# Patient Record
Sex: MALE | URBAN - METROPOLITAN AREA
[De-identification: names, ages, dates, MRNs, and addresses within clinical notes are randomized per-mention and may not be internally consistent; named-entity substitution may affect disease eponyms.]

---

## 2017-11-14 NOTE — PATIENT DISCUSSION
Pre-Op 2nd Eye Counseling: The patient has noticed an improvement in their visual symptoms in the operative eye. The patient complains of decreased vision in the fellow eye when ___reading and tv. It was explained to the patient that the decision to proceed with cataract surgery in the fellow eye is entirely a separate decision from the surgical eye. All of the same risks, benefits and alternatives are reviewed with the patient again. The patient does feel the vision in the non-operative eye is limiting their daily activities and elects to proceed with cataract surgery in the __right_____ eye. . I have given the patient the prescribed regimen of  drops to use before and after cataract surgery. Patient to administer as directed.

## 2017-11-14 NOTE — PATIENT DISCUSSION
S/P PE IOL, _os__. DOING WELL. CONTINUE PRED-GATI-BROM IN THE SURGICAL EYE  FOR A TOTAL OF 3 WEEKS USE THEN DISCONTINUE. SCHEDULE 2ND EYE CATARACT SURGERY. No

## 2018-12-04 NOTE — PATIENT DISCUSSION
POSTERIOR CAPSULAR FIBROSIS, OU___ - VISUALLY SIGNIFICANT. SCHEDULE YAG CAPSULOTOMY OD___ FIRST THEN LATER YAG CAPSULOTOMY OS____ IF VISUAL SYMPTOMS PERSIST.

## 2018-12-04 NOTE — PATIENT DISCUSSION
Posterior Capsular Fibrosis Counseling: The diagnosis of posterior capsular fibrosis (PCF), also referred to as a secondary cataract or posterior capsular opacification (PCO), was discussed with the patient. The patient understands that their symptoms and limitations are likely related to this condition. I have reviewed the risks, benefits and alternatives of  YAG laser surgery for the treatment of the fibrosis. The uncommon risk of an increase in intraocular pressure or a retinal detachment and their associated symptoms were explained to the patient. The patient understands and desires to proceed with the laser surgery to improve their vision for ___DRIVING________.

## 2020-03-06 ENCOUNTER — IMPORTED ENCOUNTER (OUTPATIENT)
Dept: URBAN - METROPOLITAN AREA CLINIC 9 | Facility: CLINIC | Age: 55
End: 2020-03-06

## 2020-04-30 ENCOUNTER — IMPORTED ENCOUNTER (OUTPATIENT)
Dept: URBAN - METROPOLITAN AREA CLINIC 9 | Facility: CLINIC | Age: 55
End: 2020-04-30

## 2020-05-01 ENCOUNTER — IMPORTED ENCOUNTER (OUTPATIENT)
Dept: URBAN - METROPOLITAN AREA CLINIC 9 | Facility: CLINIC | Age: 55
End: 2020-05-01

## 2020-05-06 ENCOUNTER — IMPORTED ENCOUNTER (OUTPATIENT)
Dept: URBAN - METROPOLITAN AREA CLINIC 9 | Facility: CLINIC | Age: 55
End: 2020-05-06

## 2020-05-26 ENCOUNTER — IMPORTED ENCOUNTER (OUTPATIENT)
Dept: URBAN - METROPOLITAN AREA CLINIC 9 | Facility: CLINIC | Age: 55
End: 2020-05-26

## 2020-07-28 ENCOUNTER — IMPORTED ENCOUNTER (OUTPATIENT)
Dept: URBAN - METROPOLITAN AREA CLINIC 9 | Facility: CLINIC | Age: 55
End: 2020-07-28

## 2020-10-27 ENCOUNTER — IMPORTED ENCOUNTER (OUTPATIENT)
Dept: URBAN - METROPOLITAN AREA CLINIC 9 | Facility: CLINIC | Age: 55
End: 2020-10-27

## 2021-02-03 NOTE — PATIENT DISCUSSION
Posterior Capsular Fibrosis Counseling: The diagnosis of posterior capsular fibrosis (PCF), also referred to as a secondary cataract or posterior capsular opacification (PCO), was discussed with the patient. The patient understands that their symptoms and limitations are likely related to this condition. I have reviewed the risks, benefits and alternatives of  YAG laser surgery for the treatment of the fibrosis. The uncommon risk of an increase in intraocular pressure or a retinal detachment and their associated symptoms were explained to the patient. The patient understands and desires to proceed with the laser surgery to improve their vision for __READING____.

## 2021-02-03 NOTE — PATIENT DISCUSSION
POSTERIOR CAPSULAR FIBROSIS, OS___ - WORSENING/VISUALLY SIGNIFICANT. SCHEDULE YAG CAPSULOTOMY OS___.

## 2021-10-16 ASSESSMENT — KERATOMETRY
OD_K2POWER_DIOPTERS: 45
OS_AXISANGLE2_DEGREES: 72
OS_AXISANGLE2_DEGREES: 87
OD_AXISANGLE_DEGREES: 26
OS_K1POWER_DIOPTERS: 43.25
OD_K2POWER_DIOPTERS: 42.75
OD_AXISANGLE_DEGREES: 180
OS_AXISANGLE_DEGREES: 162
OD_AXISANGLE2_DEGREES: 116
OS_K2POWER_DIOPTERS: 43.5
OD_K1POWER_DIOPTERS: 42.5
OS_K1POWER_DIOPTERS: 43.25
OS_K2POWER_DIOPTERS: 43.5
OD_AXISANGLE2_DEGREES: 90
OD_K1POWER_DIOPTERS: 45
OS_AXISANGLE_DEGREES: 177

## 2021-10-16 ASSESSMENT — TONOMETRY
OD_IOP_MMHG: 19
OS_IOP_MMHG: 19

## 2021-10-16 ASSESSMENT — VISUAL ACUITY
OD_CC: 20/20 - SN
OD_SC: 20/20 - SN
OS_CC: 20/20 SN
OS_SC: 20/25 + SN
OS_SC: 20/25 + SN
OS_SC: 20/25 - SN
OD_CC: 20/20 - SN
OD_SC: 20/200 SN
OD_SC: 20/200 SN
OD_CC: 20/20 SN
OS_CC: 20/20 SN
OS_CC: 20/20 SN
OD_CC: 20/20 SN
OS_CC: 20/20 SN
OD_SC: 20/400 SN
OS_CC: 20/20 SN
OD_CC: 20/20 SN
OD_SC: 20/400 SN

## 2021-10-16 ASSESSMENT — PACHYMETRY
OS_CT_UM: 574.0
OD_CT_UM: 571.0

## 2022-07-05 RX ORDER — TESTOSTERONE 16.2 MG/G
GEL TRANSDERMAL
COMMUNITY
Start: 2021-11-30 | End: 2022-08-25 | Stop reason: SDUPTHER

## 2022-07-05 RX ORDER — DEXTROAMPHETAMINE SACCHARATE, AMPHETAMINE ASPARTATE, DEXTROAMPHETAMINE SULFATE AND AMPHETAMINE SULFATE 5; 5; 5; 5 MG/1; MG/1; MG/1; MG/1
TABLET ORAL
COMMUNITY
Start: 2021-10-29

## 2022-07-05 RX ORDER — DEXTROAMPHETAMINE SACCHARATE, AMPHETAMINE ASPARTATE MONOHYDRATE, DEXTROAMPHETAMINE SULFATE AND AMPHETAMINE SULFATE 5; 5; 5; 5 MG/1; MG/1; MG/1; MG/1
CAPSULE, EXTENDED RELEASE ORAL
COMMUNITY
Start: 2022-01-24

## 2022-07-05 RX ORDER — ALBUTEROL SULFATE 90 UG/1
AEROSOL, METERED RESPIRATORY (INHALATION)
COMMUNITY

## 2022-07-05 RX ORDER — FLUTICASONE PROPIONATE 50 MCG
SPRAY, SUSPENSION (ML) NASAL
COMMUNITY

## 2022-07-05 RX ORDER — TADALAFIL 5 MG/1
TABLET ORAL
COMMUNITY
Start: 2022-04-25 | End: 2023-04-20

## 2022-10-08 PROBLEM — N40.0 BENIGN PROSTATIC HYPERPLASIA: Status: ACTIVE | Noted: 2022-10-08

## 2022-10-08 PROBLEM — Z77.011 LEAD EXPOSURE: Status: ACTIVE | Noted: 2022-10-08

## 2022-10-08 PROBLEM — F90.9 ATTENTION DEFICIT HYPERACTIVITY DISORDER (ADHD): Status: ACTIVE | Noted: 2022-10-08

## 2022-10-08 PROBLEM — E34.9 TESTOSTERONE DEFICIENCY: Status: ACTIVE | Noted: 2022-10-08

## 2022-10-08 PROBLEM — R80.9 PROTEINURIA, UNSPECIFIED: Status: ACTIVE | Noted: 2022-10-08

## 2022-10-08 PROBLEM — N52.9 ERECTILE DYSFUNCTION: Status: ACTIVE | Noted: 2022-10-08

## 2022-10-08 PROBLEM — J30.9 ALLERGIC RHINITIS: Status: ACTIVE | Noted: 2022-10-08

## 2022-10-08 PROBLEM — E83.52 SERUM CALCIUM ELEVATED: Status: ACTIVE | Noted: 2022-10-08

## 2022-10-08 PROBLEM — R97.20 ELEVATED PSA: Status: ACTIVE | Noted: 2022-10-08

## 2022-10-08 PROBLEM — J32.1 SINUSITIS CHRONIC, FRONTAL: Status: ACTIVE | Noted: 2022-10-08

## 2022-10-08 PROBLEM — J45.909 ASTHMA: Status: ACTIVE | Noted: 2022-10-08

## 2022-10-08 PROBLEM — K21.00 GASTROESOPHAGEAL REFLUX DISEASE WITH ESOPHAGITIS WITHOUT HEMORRHAGE: Status: ACTIVE | Noted: 2022-10-08

## 2022-10-08 PROBLEM — D35.2 PITUITARY ADENOMA (HCC): Status: ACTIVE | Noted: 2022-10-08

## 2022-10-08 PROBLEM — M65.312 TRIGGER THUMB, LEFT THUMB: Status: ACTIVE | Noted: 2022-10-08

## 2022-10-08 PROBLEM — E11.9 TYPE 2 DIABETES MELLITUS WITHOUT COMPLICATION (HCC): Status: ACTIVE | Noted: 2022-10-08

## 2022-10-11 ENCOUNTER — ESTABLISHED PATIENT (OUTPATIENT)
Facility: LOCATION | Age: 57
End: 2022-10-11

## 2022-10-11 DIAGNOSIS — H52.4: ICD-10-CM

## 2022-10-11 DIAGNOSIS — H17.823: ICD-10-CM

## 2022-10-11 PROCEDURE — 92015 DETERMINE REFRACTIVE STATE: CPT

## 2022-10-11 PROCEDURE — 92014 COMPRE OPH EXAM EST PT 1/>: CPT

## 2022-10-11 ASSESSMENT — TONOMETRY
OD_IOP_MMHG: 24
OS_IOP_MMHG: 25

## 2022-10-11 ASSESSMENT — VISUAL ACUITY
OS_SC: 20/20-2
OU_SC: 20/20-1
OD_SC: 20/80

## 2022-10-11 ASSESSMENT — KERATOMETRY
OS_K2POWER_DIOPTERS: 43.50
OD_K2POWER_DIOPTERS: 45.00
OS_AXISANGLE2_DEGREES: 128
OD_AXISANGLE2_DEGREES: 105
OD_AXISANGLE_DEGREES: 15
OS_K1POWER_DIOPTERS: 43.25
OS_AXISANGLE_DEGREES: 38
OD_K1POWER_DIOPTERS: 44.50

## 2025-04-28 ENCOUNTER — COMPREHENSIVE EXAM (OUTPATIENT)
Age: 60
End: 2025-04-28

## 2025-04-28 DIAGNOSIS — Z98.890: ICD-10-CM

## 2025-04-28 DIAGNOSIS — H25.13: ICD-10-CM

## 2025-04-28 DIAGNOSIS — H52.4: ICD-10-CM

## 2025-04-28 PROCEDURE — 92015 DETERMINE REFRACTIVE STATE: CPT

## 2025-04-28 PROCEDURE — 92014 COMPRE OPH EXAM EST PT 1/>: CPT
